# Patient Record
Sex: MALE | Race: BLACK OR AFRICAN AMERICAN | Employment: UNEMPLOYED | URBAN - METROPOLITAN AREA
[De-identification: names, ages, dates, MRNs, and addresses within clinical notes are randomized per-mention and may not be internally consistent; named-entity substitution may affect disease eponyms.]

---

## 2018-03-16 ENCOUNTER — OFFICE VISIT (OUTPATIENT)
Dept: INTERNAL MEDICINE CLINIC | Facility: CLINIC | Age: 23
End: 2018-03-16

## 2018-03-16 VITALS
TEMPERATURE: 96.3 F | DIASTOLIC BLOOD PRESSURE: 53 MMHG | HEIGHT: 72 IN | RESPIRATION RATE: 18 BRPM | WEIGHT: 146 LBS | BODY MASS INDEX: 19.77 KG/M2 | SYSTOLIC BLOOD PRESSURE: 95 MMHG | OXYGEN SATURATION: 97 % | HEART RATE: 60 BPM

## 2018-03-16 DIAGNOSIS — F12.90 MARIJUANA USE: ICD-10-CM

## 2018-03-16 DIAGNOSIS — F32.A DEPRESSION, UNSPECIFIED DEPRESSION TYPE: ICD-10-CM

## 2018-03-16 DIAGNOSIS — F39 MOOD DISORDER (HCC): Primary | ICD-10-CM

## 2018-03-16 RX ORDER — BUPROPION HYDROCHLORIDE 150 MG/1
150 TABLET ORAL
Qty: 30 TAB | Refills: 5 | Status: SHIPPED | OUTPATIENT
Start: 2018-03-16 | End: 2018-05-15

## 2018-03-16 NOTE — PROGRESS NOTES
Chief Complaint   Patient presents with    New Patient     Establish care. Room 7     1. Have you been to the ER, urgent care clinic since your last visit? Hospitalized since your last visit? No    2. Have you seen or consulted any other health care providers outside of the [address] [place] [address] since your last visit? Include any pap smears or colon screening.  No     Health Maintenance Due   Topic Date Due    Pneumococcal 19-64 Medium Risk (1 of 1 - PPSV23) 04/05/2012    DTaP/Tdap/Td series (1 - Tdap) 04/05/2014    Influenza Age 9 to Adult  08/01/2017

## 2018-03-16 NOTE — MR AVS SNAPSHOT
Ramona Pulse 
 
 
 [facility] 
[phone] Patient: Thelma Zepeda MRN: [id number] :1995 Visit Information Date & Time Provider Department Dept. Phone Encounter #  
 3/16/2018  2:15 PM Flo Mon PA-C [facility] Internal Medicine [phone] [id number] Follow-up Instructions Return in about 6 weeks (around 2018) for 30 minute mood disorder/depression follow up with fasting labs. Upcoming Health Maintenance Date Due Pneumococcal 19-64 Medium Risk (1 of 1 - PPSV23) 2012 DTaP/Tdap/Td series (1 - Tdap) 2014 Influenza Age 5 to Adult 2017 Allergies as of 3/16/2018  Review Complete On: 3/16/2018 By: Alesia Schroeder LPN Severity Noted Reaction Type Reactions Pear High 2014    Angioedema Other Plant, Animal, Environmental  2018    Other (comments) Seasonal allergies. Pt. States his eyes gets puffy Current Immunizations  Never Reviewed No immunizations on file. Not reviewed this visit You Were Diagnosed With   
  
 Codes Comments [facility] ([facility]    -  Primary ICD-10-CM: F39 
ICD-9-CM: 296.90 Marijuana use     ICD-10-CM: F12.90 ICD-9-CM: 305.20 Depression, unspecified depression type     ICD-10-CM: F32.9 ICD-9-CM: 982 Vitals BP Pulse Temp Resp Height(growth percentile) Weight(growth percentile) 95/53 (BP 1 Location: Left arm, BP Patient Position: Sitting) 60 96.3 °F (35.7 °C) (Oral) 18 6' (1.829 m) 146 lb (66.2 kg) SpO2 BMI Smoking Status 97% 19.8 kg/m2 Light Tobacco Smoker Vitals History BMI and BSA Data Body Mass Index Body Surface Area  
 19.8 kg/m 2 1.83 m 2 Preferred Pharmacy Pharmacy Name Phone [address] [phone] Your Updated Medication List  
  
   
 This list is accurate as of 3/16/18  3:05 PM.  Always use your most recent med list.  
  
  
  
  
 buPROPion  mg tablet Commonly known as:  Lawrence Miller Take 1 Tab by mouth every morning. Prescriptions Sent to Pharmacy Refills buPROPion XL (WELLBUTRIN XL) 150 mg tablet 5 Sig: Take 1 Tab by mouth every morning. Class: Normal  
 [facility]  #: [phone] Route: Oral  
  
We Performed the Following REFERRAL TO PSYCHIATRY [REF91 Custom] Comments:  
 [facility] 83[address] [facility], [place] [address] 
[phone] Or [facility] 200 [facility], [address], [address]  
[phone] [facility] [address] DEYSI Pelaez . 2698 [facility], [address] 
[phone] REFERRAL TO SOCIAL WORK [BCH06 Custom] Comments:  
 [email or web address] [facility]: 
[address], [place] 
[phone] [facility], [address] [place] [address], [address] 
[phone] [facility] [place] [facility] [address], [address]ORGE ALBERTO [address]   
[phone] [place] [address], [address] [place], [facility] 
[phone] Rex King, PhD 
[address] [facility] 
[phone] Follow-up Instructions Return in about 6 weeks (around 4/27/2018) for 30 minute mood disorder/depression follow up with fasting labs. Referral Information Referral ID Referred By Referred To  
  
 [id number] Samir Villalobos, BRETT   
   [address] [facility] [address] [facility] [place] [address] Phone: [phone] Fax: [phone] Visits Status Start Date End Date 1 New Request 3/16/18 3/16/19 If your referral has a status of pending review or denied, additional information will be sent to support the outcome of this decision. Introducing [facility] SERVICES! Mario Evans introduces SumoSkinny patient portal. Now you can access parts of your medical record, email your doctor's office, and request medication refills online. 1. In your internet browser, go to [email or web address] Shoot [email or web address] 2. Click on the First Time User? Click Here link in the Sign In box. You will see the New Member Sign Up page. 3. Enter your SumoSkinny Access Code exactly as it appears below. You will not need to use this code after youve completed the sign-up process. If you do not sign up before the expiration date, you must request a new code. · SumoSkinny Access Code: [email or web address] Expires: 6/14/2018  2:07 PM 
 
4. Enter the last four digits of your Social Security Number (xxxx) and Date of Birth (mm/dd/yyyy) as indicated and click Submit. You will be taken to the next sign-up page. 5. Create a SumoSkinny ID. This will be your SumoSkinny login ID and cannot be changed, so think of one that is secure and easy to remember. 6. Create a SumoSkinny password. You can change your password at any time. 7. Enter your Password Reset Question and Answer. This can be used at a later time if you forget your password. 8. Enter your e-mail address. You will receive e-mail notification when new information is available in [address] 9. Click Sign Up. You can now view and download portions of your medical record. 10. Click the Download Summary menu link to download a portable copy of your medical information. If you have questions, please visit the Frequently Asked Questions section of the SumoSkinny website. Remember, SumoSkinny is NOT to be used for urgent needs. For medical emergencies, dial 911. Now available from your iPhone and Android! Please provide this summary of care documentation to your next provider. Your primary care clinician is listed as Radha Long. If you have any questions after today's visit, please call [phone].

## 2018-03-16 NOTE — PROGRESS NOTES
HISTORY OF PRESENT ILLNESS  Sara العراقي is a 23 y.o. male. New patient. New to insurance. Chief Complaint   Patient presents with    New Patient     Establish care. Room 7     HPI  1) Stress/Grief -   Mom passed away Feb, 2017. Moved to [place] after her death. Now back in [facility] because family is here. Having a baby with paris - she is in 3rd trimester - girl. Has 2 boys and 1 girl:. Ages 9, 10, 1.   2 yo and fiance live with pt. Baby will live with pt when she is born. Oldest boy plans to move in soon. Smoking marijuana most days as this helps curb stress. Has never taken an antidepressant. Pt is applying for jobs and is currently unemployed. He is concerned about his use of marijuana while applying for jobs, but notes that he is having trouble focusing on avoiding marijuana. Pt notes that he has had trouble in the past with excessive spending and risky behavior. Last summer, he was incarcerated for failing to pay child support. Pt notes that his brother is bipolar. Review of Systems   Constitutional: Negative for fever and malaise/fatigue. Respiratory: Negative for shortness of breath. Cardiovascular: Negative for chest pain and palpitations. Psychiatric/Behavioral: Positive for depression and substance abuse. Negative for suicidal ideas. The patient is nervous/anxious. Physical Exam   Constitutional: He is oriented to person, place, and time. He appears well-developed and well-nourished. No distress. HENT:   Head: Normocephalic and atraumatic. Neck: Neck supple. No JVD present. Cardiovascular: Normal rate, regular rhythm and normal heart sounds. Pulmonary/Chest: Effort normal and breath sounds normal. No respiratory distress. Musculoskeletal: He exhibits no edema. Neurological: He is alert and oriented to person, place, and time. Skin: Skin is warm and dry. Psychiatric: He has a normal mood and affect.  His behavior is normal. Judgment and thought content normal.   Nursing note and vitals reviewed. ASSESSMENT and PLAN    ICD-10-CM ICD-9-CM    1. Mood disorder ([facility] F39 296.90 Suspected. REFERRAL TO PSYCHIATRY      REFERRAL TO SOCIAL WORK   2. Marijuana use F12.90 305.20 Start buPROPion XL (WELLBUTRIN XL) 150 mg tablet       REFERRAL TO SOCIAL WORK   3.  Depression, unspecified depression type F32.9 311 REFERRAL TO PSYCHIATRY      buPROPion XL (WELLBUTRIN XL) 150 mg tablet      REFERRAL TO SOCIAL WORK

## 2018-05-15 ENCOUNTER — OFFICE VISIT (OUTPATIENT)
Dept: INTERNAL MEDICINE CLINIC | Facility: CLINIC | Age: 23
End: 2018-05-15

## 2018-05-15 VITALS
OXYGEN SATURATION: 100 % | WEIGHT: 149 LBS | HEIGHT: 72 IN | TEMPERATURE: 97.9 F | BODY MASS INDEX: 20.18 KG/M2 | HEART RATE: 74 BPM | SYSTOLIC BLOOD PRESSURE: 98 MMHG | DIASTOLIC BLOOD PRESSURE: 62 MMHG | RESPIRATION RATE: 18 BRPM

## 2018-05-15 DIAGNOSIS — F12.90 MARIJUANA USE: ICD-10-CM

## 2018-05-15 DIAGNOSIS — Z30.09 VISIT FOR VASECTOMY EVALUATION: Primary | ICD-10-CM

## 2018-05-15 DIAGNOSIS — F39 MOOD DISORDER (HCC): ICD-10-CM

## 2018-05-15 NOTE — MR AVS SNAPSHOT
[address] [place] [address] 
[phone] Patient: Kenny Peralta MRN: [id number] :1995 Visit Information Date & Time Provider Department Dept. Phone Encounter #  
 5/15/2018  9:00 AM Kem Hancock, [address] Internal Medicine [phone] [id number] Your Appointments 7/3/2018  1:30 PM  
New Patient with Wendi Goldman NP [facility] ([facility]) Appt Note: new pt for depression and bipolar disorder; referred by Ligia Wallace; pt made appt; new pt for depression and bipolar disorder; referred by Ligia Wallace; pt made appt R/S from 18 [facility] [address]  
  
   
 [address] [facility] [address] Upcoming Health Maintenance Date Due Pneumococcal 19-64 Medium Risk (1 of 1 - PPSV23) 2012 DTaP/Tdap/Td series (1 - Tdap) 2014 Influenza Age 5 to Adult 2018 Allergies as of 5/15/2018  Review Complete On: 5/15/2018 By: Carey Salinas LPN Severity Noted Reaction Type Reactions Pear High 2014    Angioedema Other Plant, Animal, Environmental  2018    Other (comments) Seasonal allergies. Pt. States his eyes gets puffy Current Immunizations  Never Reviewed No immunizations on file. Not reviewed this visit You Were Diagnosed With   
  
 Codes Comments Visit for vasectomy evaluation    -  Primary ICD-10-CM: Z30.09 
ICD-9-CM: V25.09 Vitals BP Pulse Temp Resp Height(growth percentile) Weight(growth percentile) 98/62 (BP 1 Location: Left arm, BP Patient Position: Sitting) 74 97.9 °F (36.6 °C) (Oral) 18 6' (1.829 m) 149 lb (67.6 kg) SpO2 BMI Smoking Status 100% 20.21 kg/m2 Light Tobacco Smoker Vitals History BMI and BSA Data  Body Mass Index Body Surface Area  
 20.21 kg/m 2 1.85 m 2  
  
  
 Preferred Pharmacy Pharmacy Name Phone [facility] [address] [phone] Your Updated Medication List  
  
   
This list is accurate as of 5/15/18  9:37 AM.  Always use your most recent med list.  
  
  
  
  
 buPROPion  mg tablet Commonly known as:  Anthony Gain Take 1 Tab by mouth every morning. loratadine-pseudoephedrine 5-120 mg per tablet Commonly known as:  Alavert Allergy-Sinus Take 1 Tab by mouth two (2) times a day. Prescriptions Sent to Pharmacy Refills  
 loratadine-pseudoephedrine (ALAVERT ALLERGY-SINUS) 5-120 mg per tablet 11 Sig: Take 1 Tab by mouth two (2) times a day. Class: Normal  
 Pharmacy: [facility] [address], [address]  #: [phone] Route: Oral  
  
We Performed the Following REFERRAL TO UROLOGY [SSV352 Custom] Referral Information Referral ID Referred By Referred To  
  
 [id number] [facility]on [facility] [address]   
   [place], [address] Visits Status Start Date End Date 1 New Request 5/15/18 5/15/19 If your referral has a status of pending review or denied, additional information will be sent to support the outcome of this decision. Introducing [facility] HEALTH SERVICES! [employer] introduces TerraPerks patient portal. Now you can access parts of your medical record, email your doctor's office, and request medication refills online. 1. In your internet browser, go to [email or web address] 2. Click on the First Time User? Click Here link in the Sign In box. You will see the New Member Sign Up page. 3. Enter your TerraPerks Access Code exactly as it appears below. You will not need to use this code after youve completed the sign-up process. If you do not sign up before the expiration date, you must request a new code. · TerraPerks Access Code: [email or web address] Expires: 6/14/2018  2:07 PM 
 4. Enter the last four digits of your Social Security Number (xxxx) and Date of Birth (mm/dd/yyyy) as indicated and click Submit. You will be taken to the next sign-up page. 5. Create a Oscar ID. This will be your Oscar login ID and cannot be changed, so think of one that is secure and easy to remember. 6. Create a Oscar password. You can change your password at any time. 7. Enter your Password Reset Question and Answer. This can be used at a later time if you forget your password. 8. Enter your e-mail address. You will receive e-mail notification when new information is available in [address] 9. Click Sign Up. You can now view and download portions of your medical record. 10. Click the Download Summary menu link to download a portable copy of your medical information. If you have questions, please visit the Frequently Asked Questions section of the Oscar website. Remember, Oscar is NOT to be used for urgent needs. For medical emergencies, dial 911. Now available from your iPhone and Android! Please provide this summary of care documentation to your next provider. Your primary care clinician is listed as Kem Hancock. If you have any questions after today's visit, please call [phone].

## 2018-05-15 NOTE — PROGRESS NOTES
HISTORY OF PRESENT ILLNESS  Flavio Peña is a 20 y.o. male. Chief Complaint   Patient presents with    Depression     Follow up. Patient is fasting. Room 8     HPI  1) Depression/Mood Disorder - started Wellbutrin  mg on 3/16/18 - 4/10/18. Stopped because it made pt feel irritable    Using marijuana for tx instead. Not interested in Rx tx for Mood Disorder. Pt has another job interview tomorrow. He notes understanding of the risk of marijuana use inhibiting employment, but does not want to try any other Rx medication. Has apt 7/3 with psychiatry. Plans to keep this. 2) Would like Rx for Alavert D. Taking it BID during allergy season. Working well. 3) Wants referral for vasectomy. Has 2 girls and does not want to have any more children. Advised pt that this will be handled by another provider because of my Yarsanism beliefs. New baby girl: Robyn Antonio. She is 6days old. She and mom are doing well. Review of Systems   Constitutional: Positive for malaise/fatigue. Negative for fever. HENT: Positive for congestion and ear pain. Eyes: Positive for discharge. Respiratory: Negative for cough, sputum production and shortness of breath. Skin: Negative for rash. Neurological: Positive for headaches. Endo/Heme/Allergies: Positive for environmental allergies. Psychiatric/Behavioral: Positive for substance abuse. Negative for depression. The patient is not nervous/anxious. Physical Exam   Constitutional: He is oriented to person, place, and time. He appears well-developed and well-nourished. No distress. HENT:   Head: Normocephalic and atraumatic. Mouth/Throat: Oropharynx is clear and moist.   Bilateral TMs with fluid. No erythema. Nasal mucosa pale, inflamed. Eyes: Conjunctivae are normal.   Neck: Neck supple. Cardiovascular: Normal rate, regular rhythm and normal heart sounds.     Pulmonary/Chest: Effort normal and breath sounds normal.   Lymphadenopathy:     He has no cervical adenopathy. Neurological: He is alert and oriented to person, place, and time. Skin: Skin is warm and dry. Psychiatric: He has a normal mood and affect. His behavior is normal. Judgment and thought content normal.   Nursing note and vitals reviewed. ASSESSMENT and PLAN    ICD-10-CM ICD-9-CM    1. Visit for vasectomy evaluation Z30.09 V25.09 REFERRAL TO UROLOGY - written and signed by another provider today. 2. Marijuana use F12.90 305.20 Encouraged pt to consider the risks of using this to treat his Mood Disorder. Pt notes understanding and declines Rx trial. Encouraged pt to follow up with Psychiatry in July. 3. Mood disorder ([facility] F39 296.90 See above.

## 2018-05-15 NOTE — PROGRESS NOTES
Chief Complaint   Patient presents with    Depression     Follow up. Patient is fasting. Room 8       1. Have you been to the ER, urgent care clinic since your last visit? Hospitalized since your last visit? No    2. Have you seen or consulted any other health care providers outside of the [address] [place] [address] since your last visit? Include any pap smears or colon screening.  No     Health Maintenance Due   Topic Date Due    Pneumococcal 19-64 Medium Risk (1 of 1 - PPSV23) 04/05/2012    DTaP/Tdap/Td series (1 - Tdap) 04/05/2014

## 2019-01-16 ENCOUNTER — TELEPHONE (OUTPATIENT)
Dept: INTERNAL MEDICINE CLINIC | Age: 24
End: 2019-01-16

## 2019-01-16 NOTE — TELEPHONE ENCOUNTER
Attempted to call patient but number on file is out of service.  Need more information as to why patient is requesting a referral for a urologist

## 2019-09-04 ENCOUNTER — OFFICE VISIT (OUTPATIENT)
Dept: INTERNAL MEDICINE CLINIC | Age: 24
End: 2019-09-04

## 2019-09-04 VITALS
DIASTOLIC BLOOD PRESSURE: 71 MMHG | HEIGHT: 72 IN | SYSTOLIC BLOOD PRESSURE: 102 MMHG | TEMPERATURE: 98.5 F | OXYGEN SATURATION: 97 % | WEIGHT: 138.6 LBS | BODY MASS INDEX: 18.77 KG/M2 | HEART RATE: 68 BPM | RESPIRATION RATE: 16 BRPM

## 2019-09-04 DIAGNOSIS — F39 MOOD DISORDER (HCC): Primary | ICD-10-CM

## 2019-09-04 RX ORDER — QUETIAPINE FUMARATE 50 MG/1
50 TABLET, FILM COATED ORAL DAILY
Qty: 30 TAB | Refills: 1 | Status: SHIPPED | OUTPATIENT
Start: 2019-09-04

## 2019-09-04 NOTE — PROGRESS NOTES
1. Have you been to the ER, urgent care clinic since your last visit? Hospitalized since your last visit? No    2. Have you seen or consulted any other health care providers outside of the [address] [place] [address] since your last visit? Include any pap smears or colon screening.  No   Chief Complaint   Patient presents with    Follow-up     requesting referrals     Not fasting

## 2019-09-04 NOTE — PROGRESS NOTES
Joe Bernard is a 30 y.o. male  Chief Complaint   Patient presents with    Follow-up     requesting referrals     Visit Vitals  /71 (BP 1 Location: Left arm, BP Patient Position: At rest)   Pulse 68   Temp 98.5 °F (36.9 °C) (Oral)   Resp 16   Ht 6' (1.829 m)   Wt 138 lb 9.6 oz (62.9 kg)   SpO2 97%   BMI 18.80 kg/m²      Health Maintenance Due   Topic Date Due    Pneumococcal 0-64 years (1 of 1 - PPSV23) 04/05/1999    DTaP/Tdap/Td series (1 - Tdap) 04/05/2014    Influenza Age 5 to Adult  08/01/2019       HPI  Hx mood disorder - has been referred to psychiatry in the past. I last saw pt in 2018 and at the time, pt reported that he had a psychiatry appointment scheduled, but today he reports that he did not attend appointment 7/3/18. Still smoking marijuana daily. Working as a  5x/week. Tried Wellbutrin XL in 2018. Denies SI, but having thoughts of \"running away\" 1-2x/week. No plan. Having highs as well as lows  Highs: feels out of control. Has to bring girlfriend with him to prevent him from \"doing bad things\":  Excessive gambling. Not eating. Not doing the things that he needs to do. Girlfriend is present today and she concurs with pt's reported history. Review of Systems   Respiratory: Negative for shortness of breath. Cardiovascular: Negative for chest pain and palpitations. Neurological: Negative for dizziness, loss of consciousness and weakness. Psychiatric/Behavioral: Positive for depression and substance abuse. Negative for suicidal ideas. The patient is nervous/anxious. Physical Exam   Constitutional: He is oriented to person, place, and time. He appears well-developed and well-nourished. No distress. HENT:   Head: Normocephalic and atraumatic. Neck: Neck supple. No JVD present. No bruit bilateral carotid arteries. Cardiovascular: Normal rate, regular rhythm and normal heart sounds.    Pulmonary/Chest: Effort normal and breath sounds normal. No respiratory distress. Musculoskeletal: He exhibits no edema. Neurological: He is alert and oriented to person, place, and time. Skin: Skin is warm and dry. Psychiatric: He has a normal mood and affect. His behavior is normal. Judgment and thought content normal.   Nursing note and vitals reviewed. Diagnoses and all orders for this visit:    1. Mood disorder (HCC)  -     REFERRAL TO PSYCHIATRY  -     Start QUEtiapine (SEROQUEL) 50 mg tablet; Take 1 Tab by mouth daily.  -     CBC WITH AUTOMATED DIFF  -     METABOLIC PANEL, COMPREHENSIVE  -     TSH RFX ON ABNORMAL TO FREE T4    Encouraged good sleep hygiene. Pt requests vasectomy referral. Informed him that I do not write for contraception and he will need to see another provider for this. Pt notes understanding.

## 2019-10-15 ENCOUNTER — TELEPHONE (OUTPATIENT)
Dept: INTERNAL MEDICINE CLINIC | Age: 24
End: 2019-10-15

## 2019-10-15 ENCOUNTER — OFFICE VISIT (OUTPATIENT)
Dept: INTERNAL MEDICINE CLINIC | Age: 24
End: 2019-10-15

## 2019-10-15 VITALS
BODY MASS INDEX: 19.5 KG/M2 | HEIGHT: 72 IN | TEMPERATURE: 97.7 F | WEIGHT: 144 LBS | OXYGEN SATURATION: 99 % | DIASTOLIC BLOOD PRESSURE: 67 MMHG | HEART RATE: 57 BPM | RESPIRATION RATE: 18 BRPM | SYSTOLIC BLOOD PRESSURE: 104 MMHG

## 2019-10-15 DIAGNOSIS — Z30.09 FAMILY PLANNING: Primary | ICD-10-CM

## 2019-10-15 DIAGNOSIS — F39 MOOD DISORDER (HCC): ICD-10-CM

## 2019-10-15 NOTE — PROGRESS NOTES
1. Have you been to the ER, urgent care clinic since your last visit? Hospitalized since your last visit?no      2. Have you seen or consulted any other health care providers outside of the [address] [place] [address] since your last visit? Include any pap smears or colon screening.  No    Chief Complaint   Patient presents with    Follow-up     referral     Not fasting

## 2019-10-15 NOTE — PROGRESS NOTES
Chief Complaint   Patient presents with    Follow-up     referral     he is a 30y.o. year old male who presents for evalution. Patient reports he has not taken the Seroquel for the past two days because it has been making him so sleepy. He would like to get an alternative. He states he did see a psychologist but he will need to see someone else because he did not feel it was working out. He saw someone 3 weeks ago. He states one day he slept until 12:30, and he felt agitated. He has tried going on line to find a psychiatrist but has not been successful. He would like to have a referral to urology. He is interested in getting a vasectomy. Reviewed PmHx, RxHx, FmHx, SocHx, AllgHx and updated and dated in the chart. Review of Systems - negative except as listed above    Objective:     Vitals:    10/15/19 1002   BP: 104/67   Pulse: (!) 57   Resp: 18   Temp: 97.7 °F (36.5 °C)   TempSrc: Oral   SpO2: 99%   Weight: 144 lb (65.3 kg)   Height: 6' (1.829 m)     Physical Examination: General appearance - alert, well appearing, and in no distress  Mental status - normal mood, behavior, speech, dress, motor activity, and thought processes  Chest - clear to auscultation, no wheezes, rales or rhonchi, symmetric air entry  Heart - normal rate and regular rhythm, no murmurs noted    Assessment/ Plan:   Diagnoses and all orders for this visit:    1. Family planning  -     REFERRAL TO UROLOGY    2. Mood disorder ([facility]     patient has been given a referral to see the urologist for vasectomy consult. I will speak to [address] [place] [address] about the Seroquel. Meanwhile I have encouraged the patient to look on the back of his insurance card and call them to see who will accept his insurance. I have discussed the diagnosis with the patient and the intended plan as seen in the above orders. The patient has received an after-visit summary and questions were answered concerning future plans.      Medication Side Effects and Warnings were discussed with patient: n/a  Patient Labs were reviewed and or requested: yes  Patient Past Records were reviewed and or requested  yes    Lori Macias PA-C

## 2019-10-15 NOTE — TELEPHONE ENCOUNTER
Please let the patient I spoke with Isabelle Franco. She would like for him to take half his does of Seroquel while waiting to find his new psychiatrist.  He may follow up with her if needed.  thanks

## 2021-04-12 ENCOUNTER — HOSPITAL ENCOUNTER (EMERGENCY)
Age: 26
Discharge: HOME | End: 2021-04-12
Attending: EMERGENCY MEDICINE
Payer: MEDICAID

## 2021-04-12 VITALS
TEMPERATURE: 98.4 F | HEIGHT: 72 IN | HEART RATE: 81 BPM | WEIGHT: 150 LBS | OXYGEN SATURATION: 100 % | RESPIRATION RATE: 18 BRPM | DIASTOLIC BLOOD PRESSURE: 71 MMHG | SYSTOLIC BLOOD PRESSURE: 121 MMHG | BODY MASS INDEX: 20.32 KG/M2

## 2021-04-12 DIAGNOSIS — H10.32 ACUTE BACTERIAL CONJUNCTIVITIS OF LEFT EYE: Primary | ICD-10-CM

## 2021-04-12 PROCEDURE — 74011000250 HC RX REV CODE- 250: Performed by: EMERGENCY MEDICINE

## 2021-04-12 PROCEDURE — 99283 EMERGENCY DEPT VISIT LOW MDM: CPT

## 2021-04-12 RX ORDER — POLYMYXIN B SULFATE AND TRIMETHOPRIM 1; 10000 MG/ML; [USP'U]/ML
1 SOLUTION OPHTHALMIC EVERY 4 HOURS
Qty: 10 ML | Refills: 0 | Status: SHIPPED | OUTPATIENT
Start: 2021-04-12 | End: 2021-04-22

## 2021-04-12 RX ORDER — TETRACAINE HYDROCHLORIDE 5 MG/ML
2 SOLUTION OPHTHALMIC
Status: COMPLETED | OUTPATIENT
Start: 2021-04-12 | End: 2021-04-12

## 2021-04-12 RX ADMIN — TETRACAINE HYDROCHLORIDE 2 DROP: 5 SOLUTION OPHTHALMIC at 10:53

## 2021-04-12 RX ADMIN — FLUORESCEIN SODIUM 1 STRIP: 0.6 STRIP OPHTHALMIC at 10:53

## 2021-04-12 NOTE — ED NOTES
Pt arrives to the ED AAOX4 with a c/c of left eye redness, swelling, itching, blurred vision and light sensitivity onset two days ago. Pt is noted in stable condition, now in ED room with side rail up, bed to lowest position and call light within reach. Will continue to monitor. Emergency Department Nursing Plan of Care       The Nursing Plan of Care is developed from the Nursing assessment and Emergency Department Attending provider initial evaluation. The plan of care may be reviewed in the ED Provider note.     The Plan of Care was developed with the following considerations:   Patient / Family readiness to learn indicated by:verbalized understanding  Persons(s) to be included in education: patient  Barriers to Learning/Limitations:No    Signed     Sarika Ca    4/12/2021   10:34 AM

## 2021-04-12 NOTE — ED TRIAGE NOTES
Patient presents to the ED with c/o left eye pain and swelling. Pt reports pain and tearing. Pt denies any itching. Pt reports taking allergy medications.

## 2021-04-12 NOTE — ED PROVIDER NOTES
EMERGENCY DEPARTMENT HISTORY AND PHYSICAL EXAM      Date: 4/12/2021  Patient Name: Krissy Cardona    History of Presenting Illness     Chief Complaint   Patient presents with    Eye Pain     History Provided By: Patient    HPI: Krissy Cardona, 27 y.o. male with past medical history significant for asthma who presents via private vehicle to the ED with cc of left eye redness, blurry vision, and pain with light exposure for the past 2 days. Patient wears contacts and states he normally gets these symptoms approximately once a year when pollen season occurs. He does not have glasses so he has been wearing his contacts despite the symptoms. He does endorse a clear drainage from the left eye and denies any symptoms on the right. He denies any trauma or foreign body sensation. PMHx: Asthma  Social Hx: Smokes 1/4 pack/day, occasional alcohol use, occasional marijuana use    PCP: Eliu Thomason PA-C    There are no other complaints, changes, or physical findings at this time. No current facility-administered medications on file prior to encounter. Current Outpatient Medications on File Prior to Encounter   Medication Sig Dispense Refill    QUEtiapine (SEROQUEL) 50 mg tablet Take 1 Tab by mouth daily. 30 Tab 1    loratadine-pseudoephedrine (ALAVERT ALLERGY-SINUS) 5-120 mg per tablet Take 1 Tab by mouth two (2) times a day.  61 Tab 11     Past History     Past Medical History:  Past Medical History:   Diagnosis Date    Asthma     Marijuana use      Past Surgical History:  Past Surgical History:   Procedure Laterality Date    HX ORTHOPAEDIC  2013    Plate in right foot    HX WISDOM TEETH EXTRACTION       Family History:  Family History   Problem Relation Age of Onset    Heart Attack Mother     Hypertension Mother     HIV/AIDS Father      Social History:  Social History     Tobacco Use    Smoking status: Light Tobacco Smoker     Packs/day: 0.25     Types: Cigarettes    Smokeless tobacco: Never Used   Substance Use Topics    Alcohol use: Yes     Comment: occasionally    Drug use: Yes     Types: Marijuana     Allergies: Allergies   Allergen Reactions    Pear Angioedema    Other Plant, Animal, Environmental Other (comments)     Seasonal allergies. Pt. States his eyes gets puffy     Review of Systems   Review of Systems   Constitutional: Negative for chills and fever. HENT: Negative for congestion, rhinorrhea, sneezing and sore throat. Eyes: Positive for photophobia, pain, discharge and redness. Negative for visual disturbance. Respiratory: Negative for shortness of breath. Cardiovascular: Negative for chest pain and leg swelling. Gastrointestinal: Negative for abdominal pain, nausea and vomiting. Genitourinary: Negative for difficulty urinating and frequency. Musculoskeletal: Negative for back pain, myalgias and neck stiffness. Skin: Negative for rash. Neurological: Negative for dizziness, syncope, weakness and headaches. Hematological: Negative for adenopathy. All other systems reviewed and are negative. Physical Exam   Physical Exam  Vitals signs and nursing note reviewed. Constitutional:       Appearance: Normal appearance. He is well-developed. HENT:      Head: Normocephalic and atraumatic. Eyes:      General: No visual field deficit or scleral icterus. Left eye: No foreign body, discharge or hordeolum. Extraocular Movements: Extraocular movements intact. Conjunctiva/sclera:      Left eye: Left conjunctiva is injected. No chemosis or exudate. Pupils: Pupils are equal, round, and reactive to light. Neck:      Musculoskeletal: Full passive range of motion without pain, normal range of motion and neck supple. Cardiovascular:      Rate and Rhythm: Normal rate and regular rhythm. Pulses: Normal pulses. Heart sounds: Normal heart sounds. No murmur. Pulmonary:      Effort: Pulmonary effort is normal. No respiratory distress.       Breath sounds: Normal breath sounds. Chest:      Chest wall: No tenderness. Abdominal:      General: Bowel sounds are normal.      Palpations: Abdomen is soft. Tenderness: There is no abdominal tenderness. There is no guarding or rebound. Skin:     General: Skin is warm and dry. Findings: No erythema or rash. Neurological:      Mental Status: He is alert and oriented to person, place, and time. Psychiatric:         Speech: Speech normal.         Behavior: Behavior normal.         Thought Content: Thought content normal.         Judgment: Judgment normal.       Diagnostic Study Results   Labs -   No results found for this or any previous visit (from the past 12 hour(s)). Radiologic Studies -   No orders to display     No results found. Medical Decision Making   I am the first provider for this patient. I reviewed the vital signs, available nursing notes, past medical history, past surgical history, family history and social history. Vital Signs-Reviewed the patient's vital signs. Patient Vitals for the past 24 hrs:   Temp Pulse Resp BP SpO2   04/12/21 1012 98.4 °F (36.9 °C) 81 18 121/71 100 %     Pulse Oximetry Analysis - 100% on RA (normal)    Records Reviewed: Nursing Notes and Old Medical Records    Provider Notes (Medical Decision Making):   29-year-old male presents with left eye redness, clear drainage, and photophobia for the past 2 days. Differential includes bacterial versus viral conjunctivitis, corneal abrasion, corneal ulceration, and allergic conjunctivitis. ED Course:   Initial assessment performed. The patients presenting problems have been discussed, and they are in agreement with the care plan formulated and outlined with them. I have encouraged them to ask questions as they arise throughout their visit.     Procedure Note - Wood's lamp exam:  11:23 AM  Performed by: Lyly Valerio MD  Pts left eye was anesthetized with tetracaine, stained with fluorescein, and examined with a Wood's lamp, using lid eversion. Foreign body: no  Fluorescein uptake: no, showing no corneal abrasion or ulceration  The procedure took 15 minutes, and pt tolerated well. Progress Note:   Updated pt on all returned results and findings. Discussed the importance of proper follow up as referred below along with return precautions. Pt in agreement with the care plan and expresses agreement with and understanding of all items discussed. Disposition:  Discharge Note:  The pt is ready for discharge. The pt's signs, symptoms, diagnosis, and discharge instructions have been discussed and pt has conveyed their understanding. The pt is to follow up as recommended or return to ER should their symptoms worsen. Plan has been discussed and pt is in agreement. PLAN:  1. Current Discharge Medication List      START taking these medications    Details   trimethoprim-polymyxin b (POLYTRIM) ophthalmic solution Administer 1 Drop to both eyes every four (4) hours for 10 days. Qty: 10 mL, Refills: 0           2. Follow-up Information     Follow up With Specialties Details Why Contact Info    Solo Mead PA-C Physician Assistant Schedule an appointment as soon as possible for a visit   [address] [facility]      Ramona De La Garza MD Ophthalmology Schedule an appointment as soon as possible for a visit  or with your eye doctor [address] [place] [address]  Erika Suero 125 [phone]      [facility] EMERGENCY DEPT Emergency Medicine  As needed, If symptoms worsen [address] [facility]  [phone]        Return to ED if worse     Diagnosis     Clinical Impression:   1. Acute bacterial conjunctivitis of left eye            Please note that this dictation was completed with Dragon, computer voice recognition software.   Quite often unanticipated grammatical, syntax, homophones, and other interpretive errors are inadvertently transcribed by the computer software. Please disregard these errors. Additionally, please excuse any errors that have escaped final proofreading.

## 2021-04-12 NOTE — Clinical Note
[facility] EMERGENCY DEPT 
[address] [address] 
[phone] Work/School Note Date: 4/12/2021 To Whom It May concern: 
 
Lisa Andrade was seen and treated today in the emergency room by the following provider(s): 
Attending Provider: Grace Brasher MD. Lisa Andrade is excused from work/school on 04/12/21 and 04/13/21. He is medically clear to return to work/school on 4/14/2021.   
 
 
Sincerely, 
 
 
 
 
Grace Tafoya MD

## 2021-11-08 ENCOUNTER — HOSPITAL ENCOUNTER (EMERGENCY)
Age: 26
Discharge: HOME | End: 2021-11-08
Attending: EMERGENCY MEDICINE
Payer: MEDICAID

## 2021-11-08 VITALS
SYSTOLIC BLOOD PRESSURE: 106 MMHG | HEART RATE: 101 BPM | OXYGEN SATURATION: 98 % | DIASTOLIC BLOOD PRESSURE: 61 MMHG | BODY MASS INDEX: 19.64 KG/M2 | TEMPERATURE: 99.5 F | HEIGHT: 72 IN | RESPIRATION RATE: 18 BRPM | WEIGHT: 145 LBS

## 2021-11-08 DIAGNOSIS — K52.9 GASTROENTERITIS, ACUTE: Primary | ICD-10-CM

## 2021-11-08 LAB
ALBUMIN SERPL-MCNC: 3.9 G/DL (ref 3.5–5)
ALBUMIN/GLOB SERPL: 1.1 {RATIO} (ref 1.1–2.2)
ALP SERPL-CCNC: 77 U/L (ref 45–117)
ALT SERPL-CCNC: 24 U/L (ref 12–78)
ANION GAP SERPL CALC-SCNC: 6 MMOL/L (ref 5–15)
APPEARANCE UR: CLEAR
AST SERPL-CCNC: 20 U/L (ref 15–37)
BACTERIA URNS QL MICRO: NEGATIVE /HPF
BASOPHILS # BLD: 0 K/UL (ref 0–0.1)
BASOPHILS NFR BLD: 0 % (ref 0–1)
BILIRUB SERPL-MCNC: 0.8 MG/DL (ref 0.2–1)
BILIRUB UR QL: NEGATIVE
BUN SERPL-MCNC: 9 MG/DL (ref 6–20)
BUN/CREAT SERPL: 8 (ref 12–20)
CALCIUM SERPL-MCNC: 8.4 MG/DL (ref 8.5–10.1)
CHLORIDE SERPL-SCNC: 104 MMOL/L (ref 97–108)
CO2 SERPL-SCNC: 26 MMOL/L (ref 21–32)
COLOR UR: NORMAL
CREAT SERPL-MCNC: 1.1 MG/DL (ref 0.7–1.3)
DIFFERENTIAL METHOD BLD: ABNORMAL
EOSINOPHIL # BLD: 0.1 K/UL (ref 0–0.4)
EOSINOPHIL NFR BLD: 1 % (ref 0–7)
EPITH CASTS URNS QL MICRO: NORMAL /LPF
ERYTHROCYTE [DISTWIDTH] IN BLOOD BY AUTOMATED COUNT: 13 % (ref 11.5–14.5)
GLOBULIN SER CALC-MCNC: 3.4 G/DL (ref 2–4)
GLUCOSE SERPL-MCNC: 90 MG/DL (ref 65–100)
GLUCOSE UR STRIP.AUTO-MCNC: NEGATIVE MG/DL
HCT VFR BLD AUTO: 41.9 % (ref 36.6–50.3)
HGB BLD-MCNC: 14.3 G/DL (ref 12.1–17)
HGB UR QL STRIP: NEGATIVE
IMM GRANULOCYTES # BLD AUTO: 0 K/UL (ref 0–0.04)
IMM GRANULOCYTES NFR BLD AUTO: 0 % (ref 0–0.5)
KETONES UR QL STRIP.AUTO: NEGATIVE MG/DL
LEUKOCYTE ESTERASE UR QL STRIP.AUTO: NEGATIVE
LIPASE SERPL-CCNC: 45 U/L (ref 73–393)
LYMPHOCYTES # BLD: 0.6 K/UL (ref 0.8–3.5)
LYMPHOCYTES NFR BLD: 6 % (ref 12–49)
MCH RBC QN AUTO: 32.1 PG (ref 26–34)
MCHC RBC AUTO-ENTMCNC: 34.1 G/DL (ref 30–36.5)
MCV RBC AUTO: 94.2 FL (ref 80–99)
MONOCYTES # BLD: 0.5 K/UL (ref 0–1)
MONOCYTES NFR BLD: 5 % (ref 5–13)
NEUTS SEG # BLD: 8.5 K/UL (ref 1.8–8)
NEUTS SEG NFR BLD: 88 % (ref 32–75)
NITRITE UR QL STRIP.AUTO: NEGATIVE
NRBC # BLD: 0 K/UL (ref 0–0.01)
NRBC BLD-RTO: 0 PER 100 WBC
PH UR STRIP: 6 [PH] (ref 5–8)
PLATELET # BLD AUTO: 266 K/UL (ref 150–400)
PMV BLD AUTO: 9.8 FL (ref 8.9–12.9)
POTASSIUM SERPL-SCNC: 3.8 MMOL/L (ref 3.5–5.1)
PROT SERPL-MCNC: 7.3 G/DL (ref 6.4–8.2)
PROT UR STRIP-MCNC: NEGATIVE MG/DL
RBC # BLD AUTO: 4.45 M/UL (ref 4.1–5.7)
RBC #/AREA URNS HPF: NORMAL /HPF (ref 0–5)
RBC MORPH BLD: ABNORMAL
SODIUM SERPL-SCNC: 136 MMOL/L (ref 136–145)
SP GR UR REFRACTOMETRY: 1.01 (ref 1–1.03)
UA: UC IF INDICATED,UAUC: NORMAL
UROBILINOGEN UR QL STRIP.AUTO: 0.2 EU/DL (ref 0.2–1)
WBC # BLD AUTO: 9.7 K/UL (ref 4.1–11.1)
WBC URNS QL MICRO: NORMAL /HPF (ref 0–4)

## 2021-11-08 PROCEDURE — 99283 EMERGENCY DEPT VISIT LOW MDM: CPT

## 2021-11-08 PROCEDURE — 96375 TX/PRO/DX INJ NEW DRUG ADDON: CPT

## 2021-11-08 PROCEDURE — 85025 COMPLETE CBC W/AUTO DIFF WBC: CPT

## 2021-11-08 PROCEDURE — 83690 ASSAY OF LIPASE: CPT

## 2021-11-08 PROCEDURE — 96374 THER/PROPH/DIAG INJ IV PUSH: CPT

## 2021-11-08 PROCEDURE — 80053 COMPREHEN METABOLIC PANEL: CPT

## 2021-11-08 PROCEDURE — 96372 THER/PROPH/DIAG INJ SC/IM: CPT

## 2021-11-08 PROCEDURE — 36415 COLL VENOUS BLD VENIPUNCTURE: CPT

## 2021-11-08 PROCEDURE — 96361 HYDRATE IV INFUSION ADD-ON: CPT

## 2021-11-08 PROCEDURE — U0005 INFEC AGEN DETEC AMPLI PROBE: HCPCS

## 2021-11-08 PROCEDURE — 81001 URINALYSIS AUTO W/SCOPE: CPT

## 2021-11-08 PROCEDURE — 74011250636 HC RX REV CODE- 250/636: Performed by: PHYSICIAN ASSISTANT

## 2021-11-08 RX ORDER — DICYCLOMINE HYDROCHLORIDE 10 MG/ML
20 INJECTION INTRAMUSCULAR
Status: COMPLETED | OUTPATIENT
Start: 2021-11-08 | End: 2021-11-08

## 2021-11-08 RX ORDER — DICYCLOMINE HYDROCHLORIDE 10 MG/1
10 CAPSULE ORAL
Qty: 20 CAPSULE | Refills: 0 | Status: SHIPPED | OUTPATIENT
Start: 2021-11-08

## 2021-11-08 RX ORDER — KETOROLAC TROMETHAMINE 30 MG/ML
15 INJECTION, SOLUTION INTRAMUSCULAR; INTRAVENOUS
Status: COMPLETED | OUTPATIENT
Start: 2021-11-08 | End: 2021-11-08

## 2021-11-08 RX ORDER — ONDANSETRON 4 MG/1
4 TABLET, ORALLY DISINTEGRATING ORAL
Qty: 8 TABLET | Refills: 0 | Status: SHIPPED | OUTPATIENT
Start: 2021-11-08

## 2021-11-08 RX ORDER — ONDANSETRON 2 MG/ML
4 INJECTION INTRAMUSCULAR; INTRAVENOUS
Status: COMPLETED | OUTPATIENT
Start: 2021-11-08 | End: 2021-11-08

## 2021-11-08 RX ADMIN — KETOROLAC TROMETHAMINE 15 MG: 30 INJECTION, SOLUTION INTRAMUSCULAR; INTRAVENOUS at 15:17

## 2021-11-08 RX ADMIN — SODIUM CHLORIDE 1000 ML: 9 INJECTION, SOLUTION INTRAVENOUS at 15:18

## 2021-11-08 RX ADMIN — ONDANSETRON 4 MG: 2 INJECTION INTRAMUSCULAR; INTRAVENOUS at 15:17

## 2021-11-08 RX ADMIN — DICYCLOMINE HYDROCHLORIDE 20 MG: 20 INJECTION, SOLUTION INTRAMUSCULAR at 15:18

## 2021-11-08 NOTE — ED NOTES
Per pt reports generalized abdominal pain, +N/V/D that started this morning. Pt is alert and oriented x 4, speech is clear, no acute distress noted. Emergency Department Nursing Plan of Care       The Nursing Plan of Care is developed from the Nursing assessment and Emergency Department Attending provider initial evaluation. The plan of care may be reviewed in the ED Provider note.     The Plan of Care was developed with the following considerations:   Patient / Family readiness to learn indicated by:verbalized understanding  Persons(s) to be included in education: patient  Barriers to Learning/Limitations:No    [id number], [place]    11/8/2021   3:29 PM

## 2021-11-08 NOTE — DISCHARGE INSTRUCTIONS
It was a pleasure taking care of you at [facility] Emergency Department today. We know that when you come to [facility], you are entrusting us with your health, comfort, and safety. Our physicians and nurses honor that trust, and we truly appreciate the opportunity to care for you and your loved ones. We also value our feedback. If you receive a survey about your Emergency Department experience today, please fill it out. We care about our patients' feedback, and we listen to what you have to say. Thank you!

## 2021-11-08 NOTE — ED PROVIDER NOTES
EMERGENCY DEPARTMENT HISTORY AND PHYSICAL EXAM      Date: 11/8/2021  Patient Name: Anjel Armando    History of Presenting Illness     Chief Complaint   Patient presents with    Vomiting    Diarrhea    Abdominal Pain     History Provided By: Patient    HPI: Anjel Armando, 27 y.o. male with medical history significant for asthma and tobacco abuse who presents via private vehicle to the ED with cc of acute mild abdominal cramping with associated nausea, vomiting, diarrhea starting at approximately 3 AM today waking him from his sleep. Endorses that he did make baked zitti yesterday with ground chicken meat, but endorses other individuals in the family that ate the meal have not had similar symptoms. Patient does endorse drinking alcohol yesterday evening which he mixed with some juice (cran pineapple). He states that he believes the juice might have upset his stomach, as he could not finish the whole beverage and he did not overindulge in alcohol, only having half of one drink. Endorses he has had this juice in the past and it did not sit well with his stomach. No known sick contacts. He has not received COVID-19 vaccine. Endorses significant fatigue today. No chest pain, shortness of breath, wheezing, cough, syncope, seizure, lightheadedness, dizziness, numbness, tingling, hematemesis, urinary symptoms, hematuria, melena, hematochezia. No medications or modifying factors. Endorses that he has only been able to tolerate small sips of water today. PCP: Anne Marie Vargas PA-C    There are no other complaints, changes, or physical findings at this time. No current facility-administered medications on file prior to encounter. Current Outpatient Medications on File Prior to Encounter   Medication Sig Dispense Refill    QUEtiapine (SEROQUEL) 50 mg tablet Take 1 Tab by mouth daily.  30 Tab 1    loratadine-pseudoephedrine (ALAVERT ALLERGY-SINUS) 5-120 mg per tablet Take 1 Tab by mouth two (2) times a day. 61 Tab 11     Past History     Past Medical History:  Past Medical History:   Diagnosis Date    Asthma     Marijuana use      Past Surgical History:  Past Surgical History:   Procedure Laterality Date    HX ORTHOPAEDIC  2013    Plate in right foot    HX WISDOM TEETH EXTRACTION       Family History:  Family History   Problem Relation Age of Onset    Heart Attack Mother     Hypertension Mother     HIV/AIDS Father      Social History:  Social History     Tobacco Use    Smoking status: Light Tobacco Smoker     Packs/day: 0.25     Types: Cigarettes    Smokeless tobacco: Never Used   Substance Use Topics    Alcohol use: Yes     Comment: occasionally    Drug use: Yes     Types: Marijuana     Allergies: Allergies   Allergen Reactions    Pear Angioedema    Other Plant, Animal, Environmental Other (comments)     Seasonal allergies. Pt. States his eyes gets puffy     Review of Systems   Review of Systems   Constitutional: Positive for fatigue. Negative for activity change, appetite change, chills, diaphoresis, fever and unexpected weight change. HENT: Negative. Negative for congestion, postnasal drip, rhinorrhea, sinus pressure, sinus pain, sore throat and voice change. Eyes: Negative. Negative for photophobia, pain and visual disturbance. Respiratory: Negative for cough, chest tightness, shortness of breath and wheezing. Cardiovascular: Negative for chest pain, palpitations and leg swelling. Gastrointestinal: Positive for abdominal pain, diarrhea, nausea and vomiting. Negative for abdominal distention, blood in stool and constipation. Genitourinary: Negative for decreased urine volume, difficulty urinating, dysuria, flank pain, frequency, hematuria, penile discharge, penile pain, penile swelling, scrotal swelling, testicular pain and urgency. Musculoskeletal: Negative for arthralgias, back pain, joint swelling, myalgias and neck stiffness. Skin: Negative for rash.    Neurological: Negative for dizziness, seizures, syncope, weakness, light-headedness, numbness and headaches. Psychiatric/Behavioral: Negative. Negative for confusion. The patient is not nervous/anxious. Physical Exam   Physical Exam  Vitals and nursing note reviewed. Constitutional:       General: He is not in acute distress. Appearance: He is well-developed. He is not ill-appearing, toxic-appearing or diaphoretic. HENT:      Head: Normocephalic and atraumatic. Jaw: There is normal jaw occlusion. Right Ear: Hearing and external ear normal.      Left Ear: Hearing and external ear normal.      Nose: Nose normal.      Mouth/Throat:      Mouth: Mucous membranes are dry. Pharynx: Oropharynx is clear. Uvula midline. No pharyngeal swelling or oropharyngeal exudate. Eyes:      Extraocular Movements: Extraocular movements intact. Conjunctiva/sclera: Conjunctivae normal.      Pupils: Pupils are equal, round, and reactive to light. Cardiovascular:      Rate and Rhythm: Normal rate and regular rhythm. Heart sounds: Normal heart sounds. Pulmonary:      Effort: Pulmonary effort is normal. No accessory muscle usage or respiratory distress. Abdominal:      General: Abdomen is flat. Bowel sounds are normal.      Palpations: Abdomen is soft. Tenderness: There is no abdominal tenderness. There is no right CVA tenderness, left CVA tenderness, guarding or rebound. Negative signs include Chan's sign and McBurney's sign. Hernia: No hernia is present. Musculoskeletal:         General: Normal range of motion. Cervical back: Normal range of motion. Skin:     General: Skin is warm and dry. Coloration: Skin is not pale. Neurological:      Mental Status: He is alert and oriented to person, place, and time. Psychiatric:         Speech: Speech normal.         Behavior: Behavior normal.         Thought Content:  Thought content normal.         Judgment: Judgment normal. Diagnostic Study Results   Labs -     Recent Results (from the past 12 hour(s))   CBC WITH AUTOMATED DIFF    Collection Time: 11/08/21  3:07 PM   Result Value Ref Range    WBC 9.7 4.1 - 11.1 K/uL    RBC 4.45 4.10 - 5.70 M/uL    HGB 14.3 12.1 - 17.0 g/dL    HCT 41.9 36.6 - 50.3 %    MCV 94.2 80.0 - 99.0 FL    MCH 32.1 26.0 - 34.0 PG    MCHC 34.1 30.0 - 36.5 g/dL    RDW 13.0 11.5 - 14.5 %    PLATELET 332 370 - 139 K/uL    MPV 9.8 8.9 - 12.9 FL    NRBC 0.0 0  WBC    ABSOLUTE NRBC 0.00 0.00 - 0.01 K/uL    NEUTROPHILS 88 (H) 32 - 75 %    LYMPHOCYTES 6 (L) 12 - 49 %    MONOCYTES 5 5 - 13 %    EOSINOPHILS 1 0 - 7 %    BASOPHILS 0 0 - 1 %    IMMATURE GRANULOCYTES 0 0.0 - 0.5 %    ABS. NEUTROPHILS 8.5 (H) 1.8 - 8.0 K/UL    ABS. LYMPHOCYTES 0.6 (L) 0.8 - 3.5 K/UL    ABS. MONOCYTES 0.5 0.0 - 1.0 K/UL    ABS. EOSINOPHILS 0.1 0.0 - 0.4 K/UL    ABS. BASOPHILS 0.0 0.0 - 0.1 K/UL    ABS. IMM. GRANS. 0.0 0.00 - 0.04 K/UL    DF SMEAR SCANNED      RBC COMMENTS NORMOCYTIC, NORMOCHROMIC     METABOLIC PANEL, COMPREHENSIVE    Collection Time: 11/08/21  3:07 PM   Result Value Ref Range    Sodium 136 136 - 145 mmol/L    Potassium 3.8 3.5 - 5.1 mmol/L    Chloride 104 97 - 108 mmol/L    CO2 26 21 - 32 mmol/L    Anion gap 6 5 - 15 mmol/L    Glucose 90 65 - 100 mg/dL    BUN 9 6 - 20 MG/DL    Creatinine 1.10 0.70 - 1.30 MG/DL    BUN/Creatinine ratio 8 (L) 12 - 20      GFR est AA >60 >60 ml/min/1.73m2    GFR est non-AA >60 >60 ml/min/1.73m2    Calcium 8.4 (L) 8.5 - 10.1 MG/DL    Bilirubin, total 0.8 0.2 - 1.0 MG/DL    ALT (SGPT) 24 12 - 78 U/L    AST (SGOT) 20 15 - 37 U/L    Alk.  phosphatase 77 45 - 117 U/L    Protein, total 7.3 6.4 - 8.2 g/dL    Albumin 3.9 3.5 - 5.0 g/dL    Globulin 3.4 2.0 - 4.0 g/dL    A-G Ratio 1.1 1.1 - 2.2     LIPASE    Collection Time: 11/08/21  3:07 PM   Result Value Ref Range    Lipase 45 (L) 73 - 393 U/L   URINALYSIS W/ REFLEX CULTURE    Collection Time: 11/08/21  3:07 PM    Specimen: Urine   Result Value Ref Range    Color YELLOW/STRAW      Appearance CLEAR CLEAR      Specific gravity 1.010 1.003 - 1.030      pH (UA) 6.0 5.0 - 8.0      Protein Negative NEG mg/dL    Glucose Negative NEG mg/dL    Ketone Negative NEG mg/dL    Bilirubin Negative NEG      Blood Negative NEG      Urobilinogen 0.2 0.2 - 1.0 EU/dL    Nitrites Negative NEG      Leukocyte Esterase Negative NEG      WBC 0-4 0 - 4 /hpf    RBC 0-5 0 - 5 /hpf    Epithelial cells FEW FEW /lpf    Bacteria Negative NEG /hpf    UA:UC IF INDICATED CULTURE NOT INDICATED BY UA RESULT CNI         Radiologic Studies -   No orders to display     No results found. Medical Decision Making   I am the first provider for this patient. I reviewed the vital signs, available nursing notes, past medical history, past surgical history, family history and social history. Vital Signs-Reviewed the patient's vital signs. Patient Vitals for the past 24 hrs:   Temp Pulse Resp BP SpO2   11/08/21 1506    106/61    11/08/21 1430 99.5 °F (37.5 °C) (!) 101 18 118/64 98 %     Pulse Oximetry Analysis - 98% on RA (normal)    Records Reviewed: Nursing Notes, Old Medical Records, Previous Radiology Studies and Previous Laboratory Studies    Provider Notes (Medical Decision Making):   Patient presents with abdominal pain/n/v/d. DDx: Gastroenteritis, SBO, appendicitis, colitis, IBD, diverticulitis, mesenteric ischemia, ureteral  Stone,  pathology. Will get labs and serial abdominal exams to determine if a CT is warranted. Upon re-examination, the patient has no focal abdominal tenderness to palpation. The patient has a normal WBC and signs and symptoms are consistent with a non-surgical cause of abdominal pain. The patient has been instructed to return to the ER if the abdominal pain has not improved within 24 hours or if they have any further change in condition for a CT scan of the abdomen and pelvis.   The diagnosis, test results, medications, return instructions and follow up have been discussed with the patient. The patient has been given the opportunity to ask questions. The patient agrees and expresses understanding of the diagnosis, follow up and return instructions. The patient expresses understanding that although testing today is negative that a surgical issue could still develop and that follow up for a CT scan is essential if the symptoms have not improved in 24 hours. ED Course:   Initial assessment performed. The patients presenting problems have been discussed, and they are in agreement with the care plan formulated and outlined with them. I have encouraged them to ask questions as they arise throughout their visit. ED Course as of 11/08/21 1548   Mon Nov 08, 2021   1547 Pt resting comfortably in room in NAD. No new symptoms or complaints at this time. Available labs/ results reviewed with pt. Will po challenge and plan to d/c. [SM]      ED Course User Index  [SM] Chelo Wheeler PA-C       Progress Note:   Updated pt on all returned results and findings. Discussed the importance of proper follow up as referred below along with return precautions. Pt in agreement with the care plan and expresses agreement with and understanding of all items discussed. Disposition:  3:48 PM  I have discussed with patient their diagnosis, treatment, and follow up plan. The patient agrees to follow up as outlined in discharge paperwork and also to return to the ED with any worsening. Marnie Freire PA-C      PLAN:  1. Current Discharge Medication List      START taking these medications    Details   ondansetron (ZOFRAN ODT) 4 mg disintegrating tablet Take 1 Tablet by mouth every eight (8) hours as needed for Nausea. Qty: 8 Tablet, Refills: 0  Start date: 11/8/2021      dicyclomine (BENTYL) 10 mg capsule Take 1 Capsule by mouth three (3) times daily as needed for Abdominal Cramps. Qty: 20 Capsule, Refills: 0  Start date: 11/8/2021           2.    Follow-up Information     Follow up With Specialties Details Why Contact Info    Laura Pike PA-C Physician Assistant Schedule an appointment as soon as possible for a visit in 1 week As needed [address]  [id number]  [phone]      [facility] EMERGENCY DEPT Emergency Medicine Go to  As needed, If symptoms worsen [address] [facility]  [phone]        Return to ED if worse     Diagnosis     Clinical Impression:   1. Gastroenteritis, acute            Please note that this dictation was completed with Dragon, computer voice recognition software. Quite often unanticipated grammatical, syntax, homophones, and other interpretive errors are inadvertently transcribed by the computer software. Please disregard these errors. Additionally, please excuse any errors that have escaped final proofreading.

## 2021-11-09 ENCOUNTER — PATIENT OUTREACH (OUTPATIENT)
Dept: CASE MANAGEMENT | Age: 26
End: 2021-11-09

## 2021-11-09 LAB
SARS-COV-2, XPLCVT: NOT DETECTED
SOURCE, COVRS: NORMAL

## 2021-11-09 NOTE — PROGRESS NOTES
Patient contacted regarding COVID-19 risk. Discussed COVID-19 related testing which was pending at this time. Test results were pending. Patient informed of results, if available? no.     LPN Care Coordinator contacted the patient by telephone to perform post discharge assessment. Call within 2 business days of discharge: No Verified name and  with patient as identifiers. Provided introduction to self, and explanation of the CTN/ACM role, and reason for call due to risk factors for infection and/or exposure to COVID-19. Symptoms reviewed with patient who verbalized the following symptoms: fatigue and nausea      Due to no new or worsening symptoms encounter was not routed to provider for escalation. Discussed follow-up appointments. If no appointment was previously scheduled, appointment scheduling offered:  no. [facility] follow up appointment(s): No future appointments. Non-[facility] follow up appointment(s): n/a    Interventions to address risk factors: Education of patient/family/caregiver/guardian to support self-management-[facility] and covid numbers given     Advance Care Planning:   Does patient have an Advance Directive: not on file. Educated patient about risk for severe COVID-19 due to risk factors according to CDC guidelines. LPN CC reviewed discharge instructions, medical action plan and red flag symptoms with the patient who verbalized understanding. Discussed COVID vaccination status: yes. Education provided on COVID-19 vaccination as appropriate. Discussed exposure protocols and quarantine with CDC Guidelines. Patient was given an opportunity to verbalize any questions and concerns and agrees to contact LPN CC or health care provider for questions related to their healthcare. Reviewed and educated patient on any new and changed medications related to discharge diagnosis     Was patient discharged with a pulse oximeter? no     LPN CC provided contact information.  Plan for follow-up call in 5-7 days based on severity of symptoms and risk factors.

## 2021-11-16 ENCOUNTER — PATIENT OUTREACH (OUTPATIENT)
Dept: CASE MANAGEMENT | Age: 26
End: 2021-11-16

## 2021-11-16 NOTE — PROGRESS NOTES
Patient resolved from Transition of Care episode on 11/16/21. ACM/CTN was unsuccessful at contacting this patient today. Patient/family was provided the following resources and education related to COVID-19 during the initial call:                         Signs, symptoms and red flags related to COVID-19            CDC exposure and quarantine guidelines            [facility] exposure contact - [phone]            Contact for their local Department of Health                 Patient has not had any additional ED or hospital visits. No further outreach scheduled with this CTN/ACM. Episode of Care resolved. Patient has this CTN/ACM contact information if future needs arise.

## 2022-03-19 PROBLEM — F39 MOOD DISORDER (HCC): Status: ACTIVE | Noted: 2018-03-16

## 2022-03-19 PROBLEM — F32.A DEPRESSION: Status: ACTIVE | Noted: 2018-03-16

## 2022-09-03 ENCOUNTER — HOSPITAL ENCOUNTER (EMERGENCY)
Age: 27
Discharge: HOME | End: 2022-09-03
Attending: STUDENT IN AN ORGANIZED HEALTH CARE EDUCATION/TRAINING PROGRAM
Payer: MEDICAID

## 2022-09-03 ENCOUNTER — APPOINTMENT (OUTPATIENT)
Dept: GENERAL RADIOLOGY | Age: 27
End: 2022-09-03
Attending: STUDENT IN AN ORGANIZED HEALTH CARE EDUCATION/TRAINING PROGRAM
Payer: MEDICAID

## 2022-09-03 ENCOUNTER — APPOINTMENT (OUTPATIENT)
Dept: CT IMAGING | Age: 27
End: 2022-09-03
Attending: STUDENT IN AN ORGANIZED HEALTH CARE EDUCATION/TRAINING PROGRAM
Payer: MEDICAID

## 2022-09-03 VITALS
DIASTOLIC BLOOD PRESSURE: 60 MMHG | HEART RATE: 82 BPM | SYSTOLIC BLOOD PRESSURE: 102 MMHG | TEMPERATURE: 99 F | OXYGEN SATURATION: 100 % | RESPIRATION RATE: 18 BRPM

## 2022-09-03 DIAGNOSIS — U07.1 COVID-19: ICD-10-CM

## 2022-09-03 DIAGNOSIS — R52 GENERALIZED BODY ACHES: ICD-10-CM

## 2022-09-03 DIAGNOSIS — M54.50 ACUTE LOW BACK PAIN, UNSPECIFIED BACK PAIN LATERALITY, UNSPECIFIED WHETHER SCIATICA PRESENT: Primary | ICD-10-CM

## 2022-09-03 LAB
ALBUMIN SERPL-MCNC: 4.2 G/DL (ref 3.5–5)
ALBUMIN/GLOB SERPL: 1.3 {RATIO} (ref 1.1–2.2)
ALP SERPL-CCNC: 79 U/L (ref 45–117)
ALT SERPL-CCNC: 24 U/L (ref 12–78)
ANION GAP SERPL CALC-SCNC: 2 MMOL/L (ref 5–15)
APPEARANCE UR: CLEAR
AST SERPL-CCNC: 19 U/L (ref 15–37)
BACTERIA URNS QL MICRO: NEGATIVE /HPF
BASOPHILS # BLD: 0 K/UL (ref 0–0.1)
BASOPHILS NFR BLD: 0 % (ref 0–1)
BILIRUB SERPL-MCNC: 0.3 MG/DL (ref 0.2–1)
BILIRUB UR QL: NEGATIVE
BUN SERPL-MCNC: 10 MG/DL (ref 6–20)
BUN/CREAT SERPL: 8 (ref 12–20)
CALCIUM SERPL-MCNC: 8.9 MG/DL (ref 8.5–10.1)
CHLORIDE SERPL-SCNC: 107 MMOL/L (ref 97–108)
CO2 SERPL-SCNC: 29 MMOL/L (ref 21–32)
COLOR UR: ABNORMAL
COMMENT, HOLDF: NORMAL
COVID-19 RAPID TEST, COVR: DETECTED
CREAT SERPL-MCNC: 1.31 MG/DL (ref 0.7–1.3)
DIFFERENTIAL METHOD BLD: ABNORMAL
EOSINOPHIL # BLD: 0 K/UL (ref 0–0.4)
EOSINOPHIL NFR BLD: 0 % (ref 0–7)
EPITH CASTS URNS QL MICRO: ABNORMAL /LPF
ERYTHROCYTE [DISTWIDTH] IN BLOOD BY AUTOMATED COUNT: 13.1 % (ref 11.5–14.5)
FLUAV AG NPH QL IA: NEGATIVE
FLUBV AG NOSE QL IA: NEGATIVE
GLOBULIN SER CALC-MCNC: 3.3 G/DL (ref 2–4)
GLUCOSE SERPL-MCNC: 88 MG/DL (ref 65–100)
GLUCOSE UR STRIP.AUTO-MCNC: NEGATIVE MG/DL
HCT VFR BLD AUTO: 43.6 % (ref 36.6–50.3)
HGB BLD-MCNC: 14.7 G/DL (ref 12.1–17)
HGB UR QL STRIP: NEGATIVE
HYALINE CASTS URNS QL MICRO: ABNORMAL /LPF (ref 0–5)
IMM GRANULOCYTES # BLD AUTO: 0.1 K/UL (ref 0–0.04)
IMM GRANULOCYTES NFR BLD AUTO: 1 % (ref 0–0.5)
KETONES UR QL STRIP.AUTO: ABNORMAL MG/DL
LEUKOCYTE ESTERASE UR QL STRIP.AUTO: NEGATIVE
LYMPHOCYTES # BLD: 0.5 K/UL (ref 0.8–3.5)
LYMPHOCYTES NFR BLD: 9 % (ref 12–49)
MCH RBC QN AUTO: 31.9 PG (ref 26–34)
MCHC RBC AUTO-ENTMCNC: 33.7 G/DL (ref 30–36.5)
MCV RBC AUTO: 94.6 FL (ref 80–99)
MONOCYTES # BLD: 1 K/UL (ref 0–1)
MONOCYTES NFR BLD: 19 % (ref 5–13)
NEUTS SEG # BLD: 3.8 K/UL (ref 1.8–8)
NEUTS SEG NFR BLD: 71 % (ref 32–75)
NITRITE UR QL STRIP.AUTO: NEGATIVE
NRBC # BLD: 0 K/UL (ref 0–0.01)
NRBC BLD-RTO: 0 PER 100 WBC
PH UR STRIP: 5.5 [PH] (ref 5–8)
PLATELET # BLD AUTO: 220 K/UL (ref 150–400)
PMV BLD AUTO: 9.5 FL (ref 8.9–12.9)
POTASSIUM SERPL-SCNC: 3.6 MMOL/L (ref 3.5–5.1)
PROT SERPL-MCNC: 7.5 G/DL (ref 6.4–8.2)
PROT UR STRIP-MCNC: 30 MG/DL
RBC # BLD AUTO: 4.61 M/UL (ref 4.1–5.7)
RBC #/AREA URNS HPF: ABNORMAL /HPF (ref 0–5)
RBC MORPH BLD: ABNORMAL
SAMPLES BEING HELD,HOLD: NORMAL
SODIUM SERPL-SCNC: 138 MMOL/L (ref 136–145)
SOURCE, COVRS: ABNORMAL
SP GR UR REFRACTOMETRY: 1.03 (ref 1–1.03)
UA: UC IF INDICATED,UAUC: ABNORMAL
UROBILINOGEN UR QL STRIP.AUTO: 1 EU/DL (ref 0.2–1)
WBC # BLD AUTO: 5.4 K/UL (ref 4.1–11.1)
WBC URNS QL MICRO: ABNORMAL /HPF (ref 0–4)

## 2022-09-03 PROCEDURE — 96361 HYDRATE IV INFUSION ADD-ON: CPT

## 2022-09-03 PROCEDURE — 87804 INFLUENZA ASSAY W/OPTIC: CPT

## 2022-09-03 PROCEDURE — 96374 THER/PROPH/DIAG INJ IV PUSH: CPT

## 2022-09-03 PROCEDURE — 36415 COLL VENOUS BLD VENIPUNCTURE: CPT

## 2022-09-03 PROCEDURE — 74176 CT ABD & PELVIS W/O CONTRAST: CPT

## 2022-09-03 PROCEDURE — 74011250636 HC RX REV CODE- 250/636: Performed by: STUDENT IN AN ORGANIZED HEALTH CARE EDUCATION/TRAINING PROGRAM

## 2022-09-03 PROCEDURE — 81001 URINALYSIS AUTO W/SCOPE: CPT

## 2022-09-03 PROCEDURE — 99284 EMERGENCY DEPT VISIT MOD MDM: CPT

## 2022-09-03 PROCEDURE — 71046 X-RAY EXAM CHEST 2 VIEWS: CPT

## 2022-09-03 PROCEDURE — 80053 COMPREHEN METABOLIC PANEL: CPT

## 2022-09-03 PROCEDURE — 85025 COMPLETE CBC W/AUTO DIFF WBC: CPT

## 2022-09-03 PROCEDURE — 87635 SARS-COV-2 COVID-19 AMP PRB: CPT

## 2022-09-03 RX ORDER — KETOROLAC TROMETHAMINE 30 MG/ML
15 INJECTION, SOLUTION INTRAMUSCULAR; INTRAVENOUS
Status: COMPLETED | OUTPATIENT
Start: 2022-09-03 | End: 2022-09-03

## 2022-09-03 RX ORDER — ONDANSETRON 2 MG/ML
4 INJECTION INTRAMUSCULAR; INTRAVENOUS
Status: DISCONTINUED | OUTPATIENT
Start: 2022-09-03 | End: 2022-09-03 | Stop reason: HOSPADM

## 2022-09-03 RX ADMIN — SODIUM CHLORIDE 1000 ML: 9 INJECTION, SOLUTION INTRAVENOUS at 13:06

## 2022-09-03 RX ADMIN — KETOROLAC TROMETHAMINE 15 MG: 30 INJECTION, SOLUTION INTRAMUSCULAR at 13:07

## 2022-09-03 NOTE — ED PROVIDER NOTES
58-year-old male presents ED for evaluation of fatigue and feeling weak. Patient has had some chills and body aches, mild back pain. Symptoms started yesterday. Denies any dysuria, denies diarrhea. Denies shortness of breath or cough. Patient did take some Tylenol earlier today. Generalized Body Aches  Pertinent negatives include no chest pain, no abdominal pain and no shortness of breath. Fatigue  Pertinent negatives include no shortness of breath and no chest pain. Chills   Pertinent negatives include no chest pain, no diarrhea and no shortness of breath. Past Medical History:   Diagnosis Date    Asthma     Marijuana use        Past Surgical History:   Procedure Laterality Date    HX ORTHOPAEDIC  2013    Plate in right foot    HX WISDOM TEETH EXTRACTION           Family History:   Problem Relation Age of Onset    Heart Attack Mother     Hypertension Mother     HIV/AIDS Father        Social History     Socioeconomic History    Marital status: SINGLE     Spouse name: Not on file    Number of children: 3    Years of education: Not on file    Highest education level: Not on file   Occupational History    Occupation: Unemployed and job hunting   Tobacco Use    Smoking status: Light Smoker     Packs/day: 0.25     Types: Cigarettes    Smokeless tobacco: Never   Substance and Sexual Activity    Alcohol use: Yes     Comment: occasionally    Drug use: Yes     Types: Marijuana    Sexual activity: Yes     Partners: Female   Other Topics Concern    Not on file   Social History Narrative    03/16/18    Engaged. Ervin is pregnant with pt's 4th child. Has 3 children. Mom passed away Feb, 2017. Moved to [place] after her death. Now back in [place] because family is here. Having a baby with ervin - she is in 2rd trimester with a baby girl. Pt has 2 boys and 1 girl:. Ages 9, 10, 1.     2 yo and ervin live with pt. Baby will live with pt when she is born.  Oldest boy plans to move in soon.         Smoking marijuana most days as this helps curb stress. Has never taken an antidepressant. Pt is applying for jobs and is currently unemployed. He is concerned about his use of marijuana while applying for jobs, but notes that he is having trouble focusing on avoiding marijuana. Pt notes that he has had trouble in the past with excessive spending and risky behavior. Last summer, he was incarcerated for failing to pay child support. Pt notes that his brother is bipolar. 05/15/18    New baby girl: Juan R. She is 6days old. She and mom are doing well. Social Determinants of Health     Financial Resource Strain: Not on file   Food Insecurity: Not on file   Transportation Needs: Not on file   Physical Activity: Not on file   Stress: Not on file   Social Connections: Not on file   Intimate Partner Violence: Not on file   Housing Stability: Not on file         ALLERGIES: Pear; Amoxicillin; Other plant, animal, environmental; and Oxycodone    Review of Systems   Constitutional:  Positive for chills and fatigue. Respiratory:  Negative for shortness of breath. Cardiovascular:  Negative for chest pain. Gastrointestinal:  Negative for abdominal pain and diarrhea. Genitourinary:  Negative for dysuria. Musculoskeletal:  Positive for back pain. Vitals:    09/03/22 1103   BP: 102/60   Pulse: 82   Resp: 18   Temp: 99 °F (37.2 °C)   SpO2: 100%            Physical Exam  Vitals and nursing note reviewed. Constitutional:       General: He is not in acute distress. Appearance: Normal appearance. He is not ill-appearing. HENT:      Head: Normocephalic and atraumatic. Right Ear: External ear normal.      Left Ear: External ear normal.      Nose: Nose normal.      Mouth/Throat:      Mouth: Mucous membranes are moist.      Pharynx: Oropharynx is clear. Eyes:      Extraocular Movements: Extraocular movements intact.       Conjunctiva/sclera: Conjunctivae normal. Cardiovascular:      Rate and Rhythm: Normal rate and regular rhythm. Pulses: Normal pulses. Heart sounds: Normal heart sounds. Pulmonary:      Effort: Pulmonary effort is normal. No respiratory distress. Breath sounds: Normal breath sounds. No wheezing. Abdominal:      General: Abdomen is flat. Bowel sounds are normal.      Palpations: Abdomen is soft. Tenderness: There is no abdominal tenderness. There is no guarding. Genitourinary:     Comments: Deferred  Musculoskeletal:         General: No swelling. Normal range of motion. Cervical back: Normal range of motion. Skin:     General: Skin is warm and dry. Capillary Refill: Capillary refill takes less than 2 seconds. Findings: No rash. Neurological:      General: No focal deficit present. Mental Status: He is alert and oriented to person, place, and time. Comments: Moving all extremities   Psychiatric:         Mood and Affect: Mood normal.         Behavior: Behavior normal.      Comments: Has decision making capacity        MDM  Number of Diagnoses or Management Options  Acute low back pain, unspecified back pain laterality, unspecified whether sciatica present  COVID-19  Generalized body aches  Diagnosis management comments: Differential includes COVID-19, pneumonia, influenza, nephrolithiasis, UTI      ED Course as of 09/09/22 0847   Sat Sep 03, 2022   1239 ED evaluation included CT imaging of the abdomen pelvis given back pain, concern for nephrolithiasis. Significant for punctate bilateral renal stones. No hydronephrosis. No acute abdominal or pelvic pathology. CBC is reassuring, no leukocytosis, serum chemistries show mildly elevated serum creatinine 1.31 up from 1.109 months ago. Patient was given a liter of normal saline. Urinalysis shows trace ketones and proteins, does not appear infected. Influenza testing is negative.   COVID-19 testing is positive. [WG]   1240 Chest x-ray shows no acute cardiopulmonary findings. CT shows punctate bilateral renal stones, no hydronephrosis, no acute abdominal or pelvic pathology. [WG]   1240 Patient was given a liter of normal saline, Toradol for his body aches and Zofran for nausea. Discharge home instructions to follow-up PCP.   Return precautions discussed and agreed upon prior to discharge [WG]      ED Course User Index  [WG] Juliaette Goldberg, DO       Procedures

## 2022-09-03 NOTE — Clinical Note
[address] [address] [phone]    Work/School Note    Date: 9/3/2022     To Whom It May concern:    Chema Pink was evaluated by the following provider(s):  Attending Provider: Horacio Every virus is suspected. Per the CDC guidelines we recommend home isolation until the following conditions are all met:    1. At least five days have passed since symptoms first appeared and/or had a close exposure,   2. After home isolation for five days, wearing a mask around others for the next five days,  3. At least 24 have passed since last fever without the use of fever-reducing medications and  4.  Symptoms (eg cough, shortness of breath) have improved      Sincerely,          Angelica Hernández,

## 2022-09-03 NOTE — ED TRIAGE NOTES
Pt states yesterday he woke up feeling tired/weak. + chills, chest pain, body aches, mid back pain. Pt states \"it feels like something is sitting on my back\".  Pt did take tylenol this morning

## 2022-09-03 NOTE — DISCHARGE INSTRUCTIONS
You are seen in the emergency department for several symptoms. You have been diagnosed with COVID-19. I would like you to follow-up with your primary care physician next week for reevaluation of symptoms.   Return to the ER if you have worsening or concerning symptoms

## 2022-09-03 NOTE — Clinical Note
[address]  [address] [facility] [address] [phone]    Work/School Note    Date: 9/3/2022     To Whom It May concern:    Mila Zahidajaneth was evaluated by the following provider(s):  Attending Provider: Michaela Hose virus is suspected. Per the CDC guidelines we recommend home isolation until the following conditions are all met:    1. At least five days have passed since symptoms first appeared and/or had a close exposure,   2. After home isolation for five days, wearing a mask around others for the next five days,  3. At least 24 have passed since last fever without the use of fever-reducing medications and  4.  Symptoms (eg cough, shortness of breath) have improved      Sincerely,          Tiera Duncan RN

## 2022-09-06 ENCOUNTER — PATIENT OUTREACH (OUTPATIENT)
Dept: CASE MANAGEMENT | Age: 27
End: 2022-09-06

## 2022-09-06 NOTE — PROGRESS NOTES
Patient contacted regarding COVID-19 risk, exposure, pulse oximeter ordered at discharge, monoclonal antibody infusion follow up. Discussed COVID-19 related testing which was available at this time. Test results were positive. Patient informed of results, if available? yes. LPN Care Coordinator contacted the patient by telephone to perform post discharge assessment. Call within 2 business days of discharge: Yes Verified name and  with patient as identifiers. Provided introduction to self, and explanation of the CTN/ACM role, and reason for call due to risk factors for infection and/or exposure to COVID-19. Symptoms reviewed with patient who verbalized the following symptoms: no new symptoms and no worsening symptoms   Patient reports symptoms improved. Due to no new or worsening symptoms encounter was not routed to provider for escalation. Discussed follow-up appointments. If no appointment was previously scheduled, appointment scheduling offered:  no. [facility] follow up appointment(s): No future appointments. Non-[facility] follow up appointment(s): no    Interventions to address risk factors: Reviewed and followed up on pending diagnostic tests and treatments-COVID-19  and Education of patient/family/caregiver/guardian to support self-management-Per EMR patient to follow up PCP as needed. Advance Care Planning:   Does patient have an Advance Directive: not on file; education provided. Educated patient about risk for severe COVID-19 due to risk factors according to CDC guidelines. LPN CC reviewed discharge instructions, medical action plan and red flag symptoms with the patient who verbalized understanding. Discussed COVID vaccination status: yes. Education provided on COVID-19 vaccination as appropriate. Discussed exposure protocols and quarantine with CDC Guidelines.  Patient was given an opportunity to verbalize any questions and concerns and agrees to contact LPN CC or health care provider for questions related to their healthcare. Was patient discharged with a pulse oximeter? no    LPN CC provided contact information. No further follow-up call identified based on severity of symptoms and risk factors.

## 2022-09-08 ENCOUNTER — TELEPHONE (OUTPATIENT)
Dept: INTERNAL MEDICINE CLINIC | Age: 27
End: 2022-09-08

## 2022-09-08 NOTE — TELEPHONE ENCOUNTER
Called patient in regards to scheduling an appointment to receive a note to return back to work after Kellie Miller. Patient needed this note sooner than any appointment we had available. I recommended to go to a walk-in clinic and get a note after he gets a negative COVID result, being that this would be his quickest option.

## 2022-09-08 NOTE — TELEPHONE ENCOUNTER
----- Message from Scott Jerry sent at 9/7/2022  9:29 AM EDT -----  Subject: Appointment Request    Reason for Call: Established Patient Appointment needed: Urgent (Patient   Request) ED Follow Up Visit    QUESTIONS    Reason for appointment request? Available appointments did not meet   patient need     Additional Information for Provider? patient wants to know if he can come   in today for a ED f/u.  He was found to be covid positive 5 days ago and   needs a note to return back to work.   ---------------------------------------------------------------------------  --------------  [address] [employer]  [phone]; OK to leave message on voicemail  ---------------------------------------------------------------------------  --------------  SCRIPT ANSWERS  COVID Screen: Red